# Patient Record
Sex: FEMALE | Race: OTHER | NOT HISPANIC OR LATINO
[De-identification: names, ages, dates, MRNs, and addresses within clinical notes are randomized per-mention and may not be internally consistent; named-entity substitution may affect disease eponyms.]

---

## 2020-05-28 PROBLEM — Z00.00 ENCOUNTER FOR PREVENTIVE HEALTH EXAMINATION: Status: ACTIVE | Noted: 2020-05-28

## 2020-06-08 ENCOUNTER — APPOINTMENT (OUTPATIENT)
Dept: VASCULAR SURGERY | Facility: CLINIC | Age: 80
End: 2020-06-08
Payer: MEDICARE

## 2020-06-08 DIAGNOSIS — Z86.39 PERSONAL HISTORY OF OTHER ENDOCRINE, NUTRITIONAL AND METABOLIC DISEASE: ICD-10-CM

## 2020-06-08 DIAGNOSIS — Z86.79 PERSONAL HISTORY OF OTHER DISEASES OF THE CIRCULATORY SYSTEM: ICD-10-CM

## 2020-06-08 DIAGNOSIS — Z87.39 PERSONAL HISTORY OF OTHER DISEASES OF THE MUSCULOSKELETAL SYSTEM AND CONNECTIVE TISSUE: ICD-10-CM

## 2020-06-08 PROCEDURE — 99203 OFFICE O/P NEW LOW 30 MIN: CPT

## 2020-06-11 PROBLEM — Z87.39 HISTORY OF GOUT: Status: RESOLVED | Noted: 2020-06-11 | Resolved: 2020-06-11

## 2020-06-11 PROBLEM — Z86.79 HISTORY OF HYPERTENSION: Status: RESOLVED | Noted: 2020-06-11 | Resolved: 2020-06-11

## 2020-06-11 PROBLEM — Z86.39 HISTORY OF DIABETES MELLITUS: Status: RESOLVED | Noted: 2020-06-11 | Resolved: 2020-06-11

## 2020-06-11 NOTE — REVIEW OF SYSTEMS
[Limb Pain] : limb pain [Limb Swelling] : limb swelling [Difficulty Walking] : difficulty walking [Chills] : no chills [Fever] : no fever

## 2020-06-11 NOTE — PHYSICAL EXAM
[Normal Breath Sounds] : Normal breath sounds [Alert] : alert [Oriented to Person] : oriented to person [2+] : left 2+ [Oriented to Time] : oriented to time [Oriented to Place] : oriented to place [de-identified] : Awake and Alert [de-identified] : [facility] PC in place [de-identified] : No palpable cephalic vein right or left antecubital fossa, ecchymosis of upper arm,left hand warm and pink [de-identified] : appropriate

## 2020-06-11 NOTE — CONSULT LETTER
[Dear  ___] : Dear ~NICOLA, [Consult Closing:] : Thank you very much for allowing me to participate in the care of this patient.  If you have any questions, please do not hesitate to contact me. [Consult Letter:] : I had the pleasure of evaluating your patient, [unfilled]. [Please see my note below.] : Please see my note below. [FreeTextEntry3] : Horacio [Sincerely,] : Sincerely, [FreeTextEntry2] : Tirso Gracia

## 2020-06-11 NOTE — HISTORY OF PRESENT ILLNESS
[FreeTextEntry1] : 80 yo female presents for a HD access consultation. She is right hand dominant. She is currently undergoing HD via a [facility]. She has been undergoing HD for several weeks. She fell yesterday and her left arm is in a sling. She has a fractured humoral head. She has no history of a PPM.\par \par She is accompanied by her daughter in law who answered the questions.

## 2020-06-11 NOTE — ASSESSMENT
[FreeTextEntry1] : 78 yo female with ESRD in need of HD access. She is right hand dominant. She appears to be a candidate for a left upper extremity AVG. The timing of the graft will depend on the treatment of her fracture. She is scheduled to see an orthopedist later today. I asked the patient's daughter in law to speak to have the orthopedist call me.\par I discussed the risks and benefits of the procedure and the patient agrees to proceed.

## 2020-07-14 ENCOUNTER — APPOINTMENT (OUTPATIENT)
Dept: VASCULAR SURGERY | Facility: HOSPITAL | Age: 80
End: 2020-07-14

## 2020-07-16 ENCOUNTER — APPOINTMENT (OUTPATIENT)
Dept: VASCULAR SURGERY | Facility: HOSPITAL | Age: 80
End: 2020-07-16
Payer: MEDICARE

## 2020-07-16 PROCEDURE — 36581 REPLACE TUNNELED CV CATH: CPT | Mod: RT

## 2020-07-16 PROCEDURE — 36830 ARTERY-VEIN NONAUTOGRAFT: CPT | Mod: LT

## 2020-07-29 ENCOUNTER — APPOINTMENT (OUTPATIENT)
Dept: VASCULAR SURGERY | Facility: CLINIC | Age: 80
End: 2020-07-29
Payer: MEDICARE

## 2020-07-29 VITALS — SYSTOLIC BLOOD PRESSURE: 146 MMHG | HEART RATE: 76 BPM | DIASTOLIC BLOOD PRESSURE: 68 MMHG

## 2020-07-29 DIAGNOSIS — N18.6 END STAGE RENAL DISEASE: ICD-10-CM

## 2020-07-29 DIAGNOSIS — Z99.2 END STAGE RENAL DISEASE: ICD-10-CM

## 2020-07-29 PROCEDURE — 99024 POSTOP FOLLOW-UP VISIT: CPT

## 2020-07-29 NOTE — REVIEW OF SYSTEMS
[Fever] : no fever [Chills] : no chills [Limb Pain] : no limb pain [Limb Swelling] : no limb swelling

## 2020-07-29 NOTE — PHYSICAL EXAM
[Normal Breath Sounds] : Normal breath sounds [Normal Rate and Rhythm] : normal rate and rhythm [2+] : left 2+ [Alert] : alert [Oriented to Person] : oriented to person [Oriented to Place] : oriented to place [Oriented to Time] : oriented to time [de-identified] : Awake and Alert [de-identified] : [facility] PC in place - sutures removed [FreeTextEntry1] : palpable thrill in AVG [de-identified] : ecchymosis left upper arm, incisions c/d/i, left hand warm [de-identified] : appropriate [de-identified] : No gross motor or sensory deficits left hand

## 2020-07-29 NOTE — REASON FOR VISIT
[de-identified] : s/p left upper extremity AVG insertion and PC exchange [de-identified] : 7/16/20 [de-identified] : 78 yo female with ESRD s/p LUE AVG insertion and PC exchange. She reports that she is doing well. She is undergoing uneventful HD via the [facility]. She denies pain or numbness in her left hand.  [Family Member] : family member

## 2020-07-29 NOTE — DISCUSSION/SUMMARY
[FreeTextEntry1] : 78 yo female s/p insertion of AVG and PC exchange doing well. PC functioning well. AVG patent. Left hand warm and well perfused.\par \par Patient instructed to begin to access AVG 8/10. She will follow up 2 weeks later for PC removal.\par \par

## 2020-08-26 ENCOUNTER — APPOINTMENT (OUTPATIENT)
Dept: VASCULAR SURGERY | Facility: CLINIC | Age: 80
End: 2020-08-26